# Patient Record
Sex: FEMALE | Race: WHITE | NOT HISPANIC OR LATINO | Employment: FULL TIME | URBAN - METROPOLITAN AREA
[De-identification: names, ages, dates, MRNs, and addresses within clinical notes are randomized per-mention and may not be internally consistent; named-entity substitution may affect disease eponyms.]

---

## 2017-10-14 ENCOUNTER — APPOINTMENT (OUTPATIENT)
Dept: GENERAL RADIOLOGY | Facility: CLINIC | Age: 52
End: 2017-10-14
Attending: EMERGENCY MEDICINE
Payer: COMMERCIAL

## 2017-10-14 ENCOUNTER — HOSPITAL ENCOUNTER (EMERGENCY)
Facility: CLINIC | Age: 52
Discharge: HOME | End: 2017-10-14
Attending: EMERGENCY MEDICINE | Admitting: EMERGENCY MEDICINE
Payer: COMMERCIAL

## 2017-10-14 VITALS
HEIGHT: 65 IN | DIASTOLIC BLOOD PRESSURE: 69 MMHG | SYSTOLIC BLOOD PRESSURE: 108 MMHG | TEMPERATURE: 98.7 F | OXYGEN SATURATION: 100 % | RESPIRATION RATE: 16 BRPM

## 2017-10-14 DIAGNOSIS — S93.602A FOOT SPRAIN, LEFT, INITIAL ENCOUNTER: ICD-10-CM

## 2017-10-14 PROCEDURE — 25000132 ZZH RX MED GY IP 250 OP 250 PS 637: Performed by: EMERGENCY MEDICINE

## 2017-10-14 PROCEDURE — 99283 EMERGENCY DEPT VISIT LOW MDM: CPT

## 2017-10-14 PROCEDURE — 73630 X-RAY EXAM OF FOOT: CPT | Mod: LT

## 2017-10-14 RX ORDER — IBUPROFEN 600 MG/1
600 TABLET, FILM COATED ORAL ONCE
Status: COMPLETED | OUTPATIENT
Start: 2017-10-14 | End: 2017-10-14

## 2017-10-14 RX ADMIN — IBUPROFEN 600 MG: 600 TABLET ORAL at 12:48

## 2017-10-14 ASSESSMENT — ENCOUNTER SYMPTOMS
ARTHRALGIAS: 1
JOINT SWELLING: 1

## 2017-10-14 NOTE — DISCHARGE INSTRUCTIONS
Take ibuprofen 600 mg four times a day. Crutches with weightbearing as tolerated.     Foot Sprain    A sprain is a stretching or tearing of the ligaments that hold a joint together. There are no broken bones. Sprains generally take from 3-6 weeks to heal. A sprain may be treated with a splint, walking cast, or special boot. Mild sprains may not need any additional support.  Home care  The following guidelines will help you care for your injury at home:    Keep your leg elevated when sitting or lying down. This is very important during the first 48 hours to reduce swelling. Stay off the injured foot as much as possible until you can walk on it without pain. If needed, you may use crutches during the first week for this purpose. Crutches can be rented at many pharmacies or surgical/orthopedic supply stores.    You may be given a cast shoe to wear to prevent movement in your foot. If not, you can use a sandal or any shoe that does not put pressure on the injured area until the swelling and pain go away. If using a sandal, be careful not to hit your foot against anything, since another injury could make the sprain worse.    Apply an ice pack over the injured area for 15 to 20 minutes every 3 to 6 hours. You should do this for the first 24 to 48 hours. You can make an ice pack by filling a plastic bag that seals at the top with ice cubes and then wrapping it with a thin towel. Continue to use ice packs for relief of pain and swelling as needed. As the ice melts, avoid getting any wrap, splint, or cast wet. After 48 hours, apply heat from a warm shower or bath for 20 minutes several times daily. Alternating ice and heat may also be helpful.    You may use over-the-counter pain medicine to control pain, unless another medicine was prescribed. If you have chronic liver or kidney disease or ever had a stomach ulcer or GI bleeding, talk with your healthcare provider before using these medicines.    If you were given a splint  or cast, keep it dry. Bathe with your splint or cast well out of the water, protected with 2 large plastic bags, rubber-banded at the top end. If a fiberglass splint or cast gets wet, you can dry it with a hair dryer.    You may return to sports after healing, when you can run without pain.  Follow-up care  Follow up with your healthcare provider as directed. Sometimes fractures don t show up on the first X-ray. Bruises and sprains can sometimes hurt as much as a fracture. These injuries can take time to heal completely. If your symptoms don t improve or they get worse, talk with your healthcare provider. You may need a repeat X-ray.  When to seek medical advice  Call your healthcare provider right away if any of these occur:    The plaster cast or splint gets wet or soft    The fiberglass cast or splint gets wet and does not dry for 24 hours    Pain or swelling increases, or redness appears    A bad odor comes from within the cast    Fever of 100.4 F (38 C) or above lasting for 24 to 48 hours    Toes on the injured foot become cold, blue, numb, or tingly  Date Last Reviewed: 11/20/2015 2000-2017 [employer]. [address] [place] [address], [place] [id number]. All rights reserved. This information is not intended as a substitute for professional medical care. Always follow your healthcare professional's instructions.

## 2017-10-14 NOTE — ED AVS SNAPSHOT
Emergency Department    [address] [facility] [address]    Phone:  [phone]    Fax:  [phone]                                       Stacy Tilley   MRN: [id number]    Department:   Emergency Department   Date of Visit:  10/14/2017           Patient Information     Date Of Birth          1965        Your diagnoses for this visit were:     Foot sprain, left, initial encounter        You were seen by Mari Sanchez MD.      Follow-up Information     Follow up with [facility].    Why:  As needed    Contact information:    [address] [place] [phone]        Discharge Instructions       Take ibuprofen 600 mg four times a day. Crutches with weightbearing as tolerated.     Foot Sprain    A sprain is a stretching or tearing of the ligaments that hold a joint together. There are no broken bones. Sprains generally take from 3-6 weeks to heal. A sprain may be treated with a splint, walking cast, or special boot. Mild sprains may not need any additional support.  Home care  The following guidelines will help you care for your injury at home:    Keep your leg elevated when sitting or lying down. This is very important during the first 48 hours to reduce swelling. Stay off the injured foot as much as possible until you can walk on it without pain. If needed, you may use crutches during the first week for this purpose. Crutches can be rented at many pharmacies or surgical/orthopedic supply stores.    You may be given a cast shoe to wear to prevent movement in your foot. If not, you can use a sandal or any shoe that does not put pressure on the injured area until the swelling and pain go away. If using a sandal, be careful not to hit your foot against anything, since another injury could make the sprain worse.    Apply an ice pack over the injured area for 15 to 20 minutes every 3 to 6 hours. You should do this for the first 24 to 48 hours. You can  make an ice pack by filling a plastic bag that seals at the top with ice cubes and then wrapping it with a thin towel. Continue to use ice packs for relief of pain and swelling as needed. As the ice melts, avoid getting any wrap, splint, or cast wet. After 48 hours, apply heat from a warm shower or bath for 20 minutes several times daily. Alternating ice and heat may also be helpful.    You may use over-the-counter pain medicine to control pain, unless another medicine was prescribed. If you have chronic liver or kidney disease or ever had a stomach ulcer or GI bleeding, talk with your healthcare provider before using these medicines.    If you were given a splint or cast, keep it dry. Bathe with your splint or cast well out of the water, protected with 2 large plastic bags, rubber-banded at the top end. If a fiberglass splint or cast gets wet, you can dry it with a hair dryer.    You may return to sports after healing, when you can run without pain.  Follow-up care  Follow up with your healthcare provider as directed. Sometimes fractures don t show up on the first X-ray. Bruises and sprains can sometimes hurt as much as a fracture. These injuries can take time to heal completely. If your symptoms don t improve or they get worse, talk with your healthcare provider. You may need a repeat X-ray.  When to seek medical advice  Call your healthcare provider right away if any of these occur:    The plaster cast or splint gets wet or soft    The fiberglass cast or splint gets wet and does not dry for 24 hours    Pain or swelling increases, or redness appears    A bad odor comes from within the cast    Fever of 100.4 F (38 C) or above lasting for 24 to 48 hours    Toes on the injured foot become cold, blue, numb, or tingly  Date Last Reviewed: 11/20/2015 2000-2017 [employer]. [address] [place] [address], [place] [address]. All rights reserved. This information is not intended as a substitute for professional  medical care. Always follow your healthcare professional's instructions.          24 Hour Appointment Hotline       To make an appointment at any [facility], call [phone] ([phone]). If you don't have a family doctor or clinic, we will help you find one. [facility] are conveniently located to serve the needs of you and your family.             Review of your medicines      Notice     You have not been prescribed any medications.            Procedures and tests performed during your visit     Foot XR, G/E 3 views, left      Orders Needing Specimen Collection     None      Pending Results     No orders found from 10/12/2017 to 10/15/2017.            Pending Culture Results     No orders found from 10/12/2017 to 10/15/2017.            Pending Results Instructions     If you had any lab results that were not finalized at the time of your Discharge, you can call the ED Lab Result RN at [phone]. You will be contacted by this team for any positive Lab results or changes in treatment. The nurses are available 7 days a week from 10A to 6:30P.  You can leave a message 24 hours per day and they will return your call.        Test Results From Your Hospital Stay        10/14/2017  2:02 PM      Narrative     LEFT FOOT THREE VIEWS  10/14/2017 1:48 PM     HISTORY: foot pain    COMPARISON: None.        Impression     IMPRESSION: Normal.    OMARI RYAN MD                Clinical Quality Measure: Blood Pressure Screening     Your blood pressure was checked while you were in the emergency department today. The last reading we obtained was  BP: 108/69 . Please read the guidelines below about what these numbers mean and what you should do about them.  If your systolic blood pressure (the top number) is less than 120 and your diastolic blood pressure (the bottom number) is less than 80, then your blood pressure is normal. There is nothing more that you need to do about it.  If your systolic blood pressure  "(the top number) is 120-139 or your diastolic blood pressure (the bottom number) is 80-89, your blood pressure may be higher than it should be. You should have your blood pressure rechecked within a year by a primary care provider.  If your systolic blood pressure (the top number) is 140 or greater or your diastolic blood pressure (the bottom number) is 90 or greater, you may have high blood pressure. High blood pressure is treatable, but if left untreated over time it can put you at risk for heart attack, stroke, or kidney failure. You should have your blood pressure rechecked by a primary care provider within the next 4 weeks.  If your provider in the emergency department today gave you specific instructions to follow-up with your doctor or provider even sooner than that, you should follow that instruction and not wait for up to 4 weeks for your follow-up visit.        Thank you for choosing [facility]       Thank you for choosing [facility] for your care. Our goal is always to provide you with excellent care. Hearing back from our patients is one way we can continue to improve our services. Please take a few minutes to complete the written survey that you may receive in the mail after you visit with us. Thank you!        [employer] Information     [facility] lets you send messages to your doctor, view your test results, renew your prescriptions, schedule appointments and more. To sign up, go to [email or web address] . Click on \"Log in\" on the left side of the screen, which will take you to the Welcome page. Then click on \"Sign up Now\" on the right side of the page.     You will be asked to enter the access code listed below, as well as some personal information. Please follow the directions to create your username and password.     Your access code is: [email or web address]  Expires: 2018  2:33 PM     Your access code will  in 90 days. If you need help or a new code, please call your [facility] clinic or [phone].   "      Care EveryWhere ID     This is your Care EveryWhere ID. This could be used by other organizations to access your [facility] medical records  [id number]        Equal Access to Services     GINNY NEWMAN : Kenyatta Sher, yordan tapia, alia pollack, trace nichole. So [facility] [phone].    ATENCIÓN: Si habla español, tiene a grigsby disposición servicios gratuitos de asistencia lingüística. Llame al [phone].    We comply with applicable federal civil rights laws and [place] laws. We do not discriminate on the basis of race, color, national origin, age, disability, sex, sexual orientation, or gender identity.            After Visit Summary       This is your record. Keep this with you and show to your community pharmacist(s) and doctor(s) at your next visit.

## 2017-10-14 NOTE — ED PROVIDER NOTES
"  History     Chief Complaint:  Foot Pain     The history is provided by the patient.      Stacy Tilley is a 52 year old female who presents with her fiance, Christiano, for evaluation of foot pain. The patient's left foot slipped off a curb today while mowing her lawn. Since then she has had pain with weight bearing and some mild swelling in the foot. She rates the pain 5/10 in severity. She has no other medical concerns. She works as an  and spends most of her days sitting. She no longer has regular periods.    Allergies:  Acetaminophen      Medications:    The patient is not currently taking any prescribed medications.     Past Medical History:    Ovarian cyst    Past Surgical History:    Appendectomy  Hysterectomy total  Bilateral salpingo-oophorectomy  Laparotomy  Omentectomy    Family History:    History reviewed. No pertinent family history.      Social History:  Presents with rohan   Tobacco use: Never  Alcohol use: No  PCP: [facility]    Marital Status:  Single      Review of Systems   Musculoskeletal: Positive for arthralgias, gait problem and joint swelling.     Physical Exam   Patient Vitals for the past 24 hrs:   BP Temp Temp src Heart Rate Resp SpO2 Height   10/14/17 1243 108/69 98.7  F (37.1  C) Oral 64 16 100 % 1.651 m (5' 5\")      Physical Exam  General: Resting comfortably on the gurney  Head:  The scalp, face, and head appear normal  Neck:  Normal range of motion  MS:  Left foot area of tenderness around dorsal proximal foot, mild ecchymosis and mild swelling. CMS intact. No ankle tenderness.  Neuro:  Speech is normal and fluent. Moves all 4 extremities.   Psych:  Awake. Alert.  Normal affect.      Appropriate interactions     Emergency Department Course   Imaging:  Radiographic findings were communicated with the patient who voiced understanding of the findings.  Foot XR, G/E 3 Views, Left  IMPRESSION: Normal.    OMARI RYAN MD    Imaging independently reviewed and " agree with radiologist interpretation.     Interventions:  1248: Advil 600 mg PO    Emergency Department Course:  Past medical records, nursing notes, and vitals reviewed.  1422: I performed an exam of the patient and obtained history, as documented above.    ACe wrap applied.   Findings and plan explained to the Patient. Patient discharged home with instructions regarding supportive care, medications, and reasons to return. The importance of close follow-up was reviewed.    Impression & Plan    Medical Decision Making:  Patient is a 52-year-old who twisted her left foot today. She has tenderness at the proximal dorsal lateral foot. X-ray is negative. She has a foot sprain which she'll be treated with Ace wrap conservative measures and crutches with weightbearing as tolerated. She has crutches at home and she'll be taken out by  the wheelchair. She can take ibuprofen 600 q.i.d. as needed and follow-up with [facility] as needed      Diagnosis:    ICD-10-CM   1. Foot sprain, left, initial encounter S93.602A       Disposition:  Discharged to home with plan as outlined.      David Bernal  10/14/2017    EMERGENCY DEPARTMENT  I, David Bernal, am serving as a scribe at 2:22 PM on 10/14/2017 to document services personally performed by Mari Sanchez MD based on my observations and the provider's statements to me.       Mari Sanchez MD  10/14/17 8187

## 2017-10-14 NOTE — ED AVS SNAPSHOT
Emergency Department    [address] [place] [address]    Phone:  [phone]    Fax:  [phone]                                       Stacy Tilley   MRN: [id number]    Department:   Emergency Department   Date of Visit:  10/14/2017           After Visit Summary Signature Page     I have received my discharge instructions, and my questions have been answered. I have discussed any challenges I see with this plan with the nurse or doctor.    ..........................................................................................................................................  Patient/Patient Representative Signature      ..........................................................................................................................................  Patient Representative Print Name and Relationship to Patient    ..................................................               ................................................  Date                                            Time    ..........................................................................................................................................  Reviewed by Signature/Title    ...................................................              ..............................................  Date                                                            Time

## 2019-11-14 ENCOUNTER — OFFICE VISIT (OUTPATIENT)
Dept: URGENT CARE | Facility: URGENT CARE | Age: 54
End: 2019-11-14
Payer: COMMERCIAL

## 2019-11-14 VITALS
TEMPERATURE: 97.3 F | DIASTOLIC BLOOD PRESSURE: 58 MMHG | RESPIRATION RATE: 18 BRPM | WEIGHT: 153 LBS | BODY MASS INDEX: 25.46 KG/M2 | OXYGEN SATURATION: 97 % | SYSTOLIC BLOOD PRESSURE: 94 MMHG | HEART RATE: 66 BPM

## 2019-11-14 DIAGNOSIS — J20.9 ACUTE BRONCHITIS WITH COEXISTING CONDITION REQUIRING PROPHYLACTIC TREATMENT: Primary | ICD-10-CM

## 2019-11-14 PROCEDURE — 99203 OFFICE O/P NEW LOW 30 MIN: CPT | Performed by: FAMILY MEDICINE

## 2019-11-14 RX ORDER — AZITHROMYCIN 250 MG/1
TABLET, FILM COATED ORAL
Qty: 6 TABLET | Refills: 0 | Status: SHIPPED | OUTPATIENT
Start: 2019-11-14 | End: 2019-11-19

## 2019-11-14 NOTE — PROGRESS NOTES
SUBJECTIVE: Stacy Tilley is a 54 year old female presenting with a chief complaint of nasal congestion and cough .  Onset of symptoms was week(s) ago.  Course of illness is same.    Severity moderate  Current and Associated symptoms: runny nose, stuffy nose and cough - non-productive  Treatment measures tried include Tylenol/Ibuprofen and zytrtec for allegis.  Predisposing factors include seasonal allergies.    No past medical history on file.  Allergies   Allergen Reactions     Acetaminophen Difficulty breathing     Social History     Tobacco Use     Smoking status: Never Smoker     Smokeless tobacco: Never Used   Substance Use Topics     Alcohol use: No     Alcohol/week: 0.0 standard drinks       ROS:  SKIN: no rash  GI: no vomiting    OBJECTIVE:  BP 94/58   Pulse 66   Temp 97.3  F (36.3  C) (Tympanic)   Resp 18   Wt 69.4 kg (153 lb)   LMP 07/28/2004   SpO2 97%   BMI 25.46 kg/m  GENERAL APPEARANCE: healthy, alert and no distress  EYES: EOMI,  PERRL, conjunctiva clear  HENT: ear canals and TM's normal.  Nose and mouth without ulcers, erythema or lesions  NECK: supple, nontender, no lymphadenopathy  RESP: lungs clear to auscultation - no rales, rhonchi or wheezes  CV: regular rates and rhythm, normal S1 S2, no murmur noted  SKIN: no suspicious lesions or rashes      ICD-10-CM    1. Acute bronchitis with coexisting condition requiring prophylactic treatment J20.9 azithromycin (ZITHROMAX) 250 MG tablet       Fluids/Rest, f/u if worse/not any better

## 2021-04-21 ENCOUNTER — IMMUNIZATION (OUTPATIENT)
Dept: NURSING | Facility: CLINIC | Age: 56
End: 2021-04-21
Payer: COMMERCIAL

## 2021-04-21 PROCEDURE — 91300 PR COVID VAC PFIZER DIL RECON 30 MCG/0.3 ML IM: CPT

## 2021-04-21 PROCEDURE — 0001A PR COVID VAC PFIZER DIL RECON 30 MCG/0.3 ML IM: CPT

## 2021-05-12 ENCOUNTER — IMMUNIZATION (OUTPATIENT)
Dept: NURSING | Facility: CLINIC | Age: 56
End: 2021-05-12
Attending: INTERNAL MEDICINE
Payer: COMMERCIAL

## 2021-05-12 PROCEDURE — 0002A PR COVID VAC PFIZER DIL RECON 30 MCG/0.3 ML IM: CPT

## 2021-05-12 PROCEDURE — 91300 PR COVID VAC PFIZER DIL RECON 30 MCG/0.3 ML IM: CPT

## 2021-05-15 ENCOUNTER — HEALTH MAINTENANCE LETTER (OUTPATIENT)
Age: 56
End: 2021-05-15

## 2021-09-04 ENCOUNTER — HEALTH MAINTENANCE LETTER (OUTPATIENT)
Age: 56
End: 2021-09-04

## 2021-12-07 ENCOUNTER — IMMUNIZATION (OUTPATIENT)
Dept: NURSING | Facility: CLINIC | Age: 56
End: 2021-12-07
Payer: COMMERCIAL

## 2021-12-07 PROCEDURE — 91300 PR COVID VAC PFIZER DIL RECON 30 MCG/0.3 ML IM: CPT

## 2021-12-07 PROCEDURE — 0004A PR COVID VAC PFIZER DIL RECON 30 MCG/0.3 ML IM: CPT

## 2021-12-10 ENCOUNTER — IMMUNIZATION (OUTPATIENT)
Dept: FAMILY MEDICINE | Facility: CLINIC | Age: 56
End: 2021-12-10
Payer: COMMERCIAL

## 2021-12-10 PROCEDURE — 90471 IMMUNIZATION ADMIN: CPT

## 2021-12-10 PROCEDURE — 90682 RIV4 VACC RECOMBINANT DNA IM: CPT

## 2022-06-11 ENCOUNTER — HEALTH MAINTENANCE LETTER (OUTPATIENT)
Age: 57
End: 2022-06-11

## 2022-06-17 ENCOUNTER — IMMUNIZATION (OUTPATIENT)
Dept: NURSING | Facility: CLINIC | Age: 57
End: 2022-06-17
Payer: COMMERCIAL

## 2022-06-17 PROCEDURE — 91305 COVID-19,PF,PFIZER (12+ YRS): CPT

## 2022-06-17 PROCEDURE — 0054A COVID-19,PF,PFIZER (12+ YRS): CPT

## 2022-10-22 ENCOUNTER — HEALTH MAINTENANCE LETTER (OUTPATIENT)
Age: 57
End: 2022-10-22

## 2022-11-23 ENCOUNTER — IMMUNIZATION (OUTPATIENT)
Dept: INTERNAL MEDICINE | Facility: CLINIC | Age: 57
End: 2022-11-23
Payer: COMMERCIAL

## 2022-11-23 DIAGNOSIS — Z23 HIGH PRIORITY FOR 2019-NCOV VACCINE: Primary | ICD-10-CM

## 2022-11-23 PROCEDURE — 0124A COVID-19 VACCINE BIVALENT BOOSTER 12+ (PFIZER): CPT

## 2022-11-23 PROCEDURE — 91312 COVID-19 VACCINE BIVALENT BOOSTER 12+ (PFIZER): CPT

## 2022-12-16 ENCOUNTER — IMMUNIZATION (OUTPATIENT)
Dept: NURSING | Facility: CLINIC | Age: 57
End: 2022-12-16
Payer: COMMERCIAL

## 2022-12-16 PROCEDURE — 90682 RIV4 VACC RECOMBINANT DNA IM: CPT

## 2022-12-16 PROCEDURE — 90471 IMMUNIZATION ADMIN: CPT

## 2023-06-01 ENCOUNTER — HEALTH MAINTENANCE LETTER (OUTPATIENT)
Age: 58
End: 2023-06-01

## 2023-06-17 ENCOUNTER — HEALTH MAINTENANCE LETTER (OUTPATIENT)
Age: 58
End: 2023-06-17

## 2023-09-14 ENCOUNTER — HOSPITAL ENCOUNTER (OUTPATIENT)
Dept: MAMMOGRAPHY | Facility: CLINIC | Age: 58
Discharge: HOME | End: 2023-09-14
Attending: FAMILY MEDICINE | Admitting: FAMILY MEDICINE
Payer: COMMERCIAL

## 2023-09-14 DIAGNOSIS — Z12.31 ENCOUNTER FOR SCREENING MAMMOGRAM FOR BREAST CANCER: ICD-10-CM

## 2023-09-14 PROCEDURE — 77067 SCR MAMMO BI INCL CAD: CPT

## 2023-10-27 ENCOUNTER — IMMUNIZATION (OUTPATIENT)
Dept: NURSING | Facility: CLINIC | Age: 58
End: 2023-10-27
Payer: COMMERCIAL

## 2023-10-27 PROCEDURE — 90686 IIV4 VACC NO PRSV 0.5 ML IM: CPT

## 2023-10-27 PROCEDURE — 90471 IMMUNIZATION ADMIN: CPT

## 2023-12-08 ENCOUNTER — IMMUNIZATION (OUTPATIENT)
Dept: NURSING | Facility: CLINIC | Age: 58
End: 2023-12-08
Payer: COMMERCIAL

## 2023-12-08 PROCEDURE — 91320 SARSCV2 VAC 30MCG TRS-SUC IM: CPT

## 2023-12-08 PROCEDURE — 90480 ADMN SARSCOV2 VAC 1/ONLY CMP: CPT

## 2024-08-10 ENCOUNTER — HEALTH MAINTENANCE LETTER (OUTPATIENT)
Age: 59
End: 2024-08-10

## 2024-10-28 ENCOUNTER — IMMUNIZATION (OUTPATIENT)
Dept: INTERNAL MEDICINE | Facility: CLINIC | Age: 59
End: 2024-10-28
Payer: COMMERCIAL

## 2024-10-28 PROCEDURE — 91320 SARSCV2 VAC 30MCG TRS-SUC IM: CPT

## 2024-10-28 PROCEDURE — 90480 ADMN SARSCOV2 VAC 1/ONLY CMP: CPT

## 2024-10-28 PROCEDURE — 90673 RIV3 VACCINE NO PRESERV IM: CPT

## 2024-10-28 PROCEDURE — 90471 IMMUNIZATION ADMIN: CPT

## 2025-05-03 ENCOUNTER — HEALTH MAINTENANCE LETTER (OUTPATIENT)
Age: 60
End: 2025-05-03